# Patient Record
Sex: MALE | Race: WHITE | NOT HISPANIC OR LATINO | URBAN - METROPOLITAN AREA
[De-identification: names, ages, dates, MRNs, and addresses within clinical notes are randomized per-mention and may not be internally consistent; named-entity substitution may affect disease eponyms.]

---

## 2023-07-26 ENCOUNTER — EMERGENCY (EMERGENCY)
Facility: HOSPITAL | Age: 53
LOS: 1 days | Discharge: HOME | End: 2023-07-26
Admitting: EMERGENCY MEDICINE
Payer: SELF-PAY

## 2023-07-26 VITALS
TEMPERATURE: 100 F | HEART RATE: 75 BPM | DIASTOLIC BLOOD PRESSURE: 94 MMHG | RESPIRATION RATE: 18 BRPM | OXYGEN SATURATION: 98 % | WEIGHT: 190.04 LBS | SYSTOLIC BLOOD PRESSURE: 146 MMHG

## 2023-07-26 DIAGNOSIS — J02.9 ACUTE PHARYNGITIS, UNSPECIFIED: ICD-10-CM

## 2023-07-26 DIAGNOSIS — R07.0 PAIN IN THROAT: ICD-10-CM

## 2023-07-26 PROCEDURE — 99284 EMERGENCY DEPT VISIT MOD MDM: CPT

## 2023-07-26 RX ORDER — DEXAMETHASONE 0.5 MG/5ML
8 ELIXIR ORAL ONCE
Refills: 0 | Status: COMPLETED | OUTPATIENT
Start: 2023-07-26 | End: 2023-07-26

## 2023-07-26 RX ORDER — PENICILLIN V POTASIUM 500 MG/1
1 TABLET OROPHARYNGEAL
Qty: 14 | Refills: 0
Start: 2023-07-26 | End: 2023-08-01

## 2023-07-26 RX ORDER — IBUPROFEN 200 MG
600 TABLET ORAL ONCE
Refills: 0 | Status: COMPLETED | OUTPATIENT
Start: 2023-07-26 | End: 2023-07-26

## 2023-07-26 RX ORDER — DEXAMETHASONE 0.5 MG/5ML
8 ELIXIR ORAL ONCE
Refills: 0 | Status: DISCONTINUED | OUTPATIENT
Start: 2023-07-26 | End: 2023-07-26

## 2023-07-26 RX ADMIN — Medication 8 MILLIGRAM(S): at 14:03

## 2023-07-26 RX ADMIN — Medication 600 MILLIGRAM(S): at 14:03

## 2023-07-26 NOTE — ED ADULT TRIAGE NOTE - CHIEF COMPLAINT QUOTE
BIBEMS for throat swelling/soreness s/p eating a banana. initial call to EMS was made by the hotel staff for anaphylaxis. Pt denies allergies, airway patent, breathing normal, denies hives itching to throat or chest tightness. slight swelling noted, states he has has throat pain prior to incident. 100.2 temp  in traige. just travel from [place] last night. PMD dm  but states that is baseline

## 2023-07-26 NOTE — ED ADULT NURSE NOTE - OBJECTIVE STATEMENT
aox3, breathing even and unlabored c/o throat swelling. speaking in full sentences. denies difficulty breathing.

## 2023-07-26 NOTE — ED PROVIDER NOTE - OBJECTIVE STATEMENT
Past medical history of hypertension hyperlipidemia diabetes on Levemir presents with throat swelling that he noted over the last 1 to 2 hours.  Denies fever, chills, sweats.  Patient is visiting from [place] arrived yesterday.

## 2023-07-26 NOTE — ED PROVIDER NOTE - PATIENT PORTAL LINK FT
You can access the FollowMyHealth Patient Portal offered by [facility] by registering at the following website: [email or web address]. By joining [facility]’s FollowMyHealth portal, you will also be able to view your health information using other applications (apps) compatible with our system.

## 2023-07-26 NOTE — ED PROVIDER NOTE - CLINICAL SUMMARY MEDICAL DECISION MAKING FREE TEXT BOX
Patient presents with throat swelling left greater than right which he noted 1 to 2 hours prior to arrival.  Noted to have low-grade fever.  Denies fever, chills, sweats.  On exam patient is well-appearing neurovascularly intact there is bilateral pharyngeal edema with exudates no ulnar deviation.  We will treat for suspected strep with Pen-Vee K.  Decadron given in ED all precautions reviewed.